# Patient Record
Sex: FEMALE | Race: WHITE | NOT HISPANIC OR LATINO | URBAN - METROPOLITAN AREA
[De-identification: names, ages, dates, MRNs, and addresses within clinical notes are randomized per-mention and may not be internally consistent; named-entity substitution may affect disease eponyms.]

---

## 2025-01-06 ASSESSMENT — PROMIS GLOBAL HEALTH SCALE
EMOTIONAL_PROBLEMS: SOMETIMES
CARRYOUT_SOCIAL_ACTIVITIES: VERY GOOD
RATE_AVERAGE_FATIGUE: MILD
RATE_SOCIAL_SATISFACTION: VERY GOOD
CARRYOUT_PHYSICAL_ACTIVITIES: COMPLETELY
RATE_QUALITY_OF_LIFE: VERY GOOD
RATE_AVERAGE_PAIN: 1
RATE_MENTAL_HEALTH: VERY GOOD
RATE_PHYSICAL_HEALTH: VERY GOOD
RATE_GENERAL_HEALTH: VERY GOOD

## 2025-01-07 ENCOUNTER — LAB (OUTPATIENT)
Dept: LAB | Facility: LAB | Age: 55
End: 2025-01-07
Payer: COMMERCIAL

## 2025-01-07 ENCOUNTER — APPOINTMENT (OUTPATIENT)
Dept: PRIMARY CARE | Facility: CLINIC | Age: 55
End: 2025-01-07
Payer: COMMERCIAL

## 2025-01-07 VITALS
BODY MASS INDEX: 20.24 KG/M2 | SYSTOLIC BLOOD PRESSURE: 108 MMHG | DIASTOLIC BLOOD PRESSURE: 71 MMHG | HEART RATE: 97 BPM | HEIGHT: 62 IN | WEIGHT: 110 LBS

## 2025-01-07 DIAGNOSIS — Z00.00 ROUTINE GENERAL MEDICAL EXAMINATION AT A HEALTH CARE FACILITY: Primary | ICD-10-CM

## 2025-01-07 DIAGNOSIS — Z12.31 ENCOUNTER FOR SCREENING MAMMOGRAM FOR MALIGNANT NEOPLASM OF BREAST: ICD-10-CM

## 2025-01-07 DIAGNOSIS — Z00.00 ROUTINE GENERAL MEDICAL EXAMINATION AT A HEALTH CARE FACILITY: ICD-10-CM

## 2025-01-07 PROBLEM — L30.9 ECZEMA: Status: ACTIVE | Noted: 2025-01-07

## 2025-01-07 PROBLEM — N39.0 RECURRENT UTI: Status: ACTIVE | Noted: 2025-01-07

## 2025-01-07 PROBLEM — N93.8 DUB (DYSFUNCTIONAL UTERINE BLEEDING): Status: ACTIVE | Noted: 2017-12-05

## 2025-01-07 PROBLEM — E78.2 MIXED HYPERLIPIDEMIA: Status: ACTIVE | Noted: 2025-01-07

## 2025-01-07 LAB
25(OH)D3 SERPL-MCNC: 30 NG/ML (ref 30–100)
ALBUMIN SERPL BCP-MCNC: 4.6 G/DL (ref 3.4–5)
ALP SERPL-CCNC: 44 U/L (ref 33–110)
ALT SERPL W P-5'-P-CCNC: 13 U/L (ref 7–45)
ANION GAP SERPL CALC-SCNC: 14 MMOL/L (ref 10–20)
AST SERPL W P-5'-P-CCNC: 21 U/L (ref 9–39)
BASOPHILS # BLD AUTO: 0.03 X10*3/UL (ref 0–0.1)
BASOPHILS NFR BLD AUTO: 0.6 %
BILIRUB SERPL-MCNC: 0.5 MG/DL (ref 0–1.2)
BUN SERPL-MCNC: 17 MG/DL (ref 6–23)
CALCIUM SERPL-MCNC: 9.8 MG/DL (ref 8.6–10.6)
CHLORIDE SERPL-SCNC: 103 MMOL/L (ref 98–107)
CO2 SERPL-SCNC: 28 MMOL/L (ref 21–32)
CREAT SERPL-MCNC: 0.76 MG/DL (ref 0.5–1.05)
CREAT UR-MCNC: 134.1 MG/DL (ref 20–320)
EGFRCR SERPLBLD CKD-EPI 2021: >90 ML/MIN/1.73M*2
EOSINOPHIL # BLD AUTO: 0.05 X10*3/UL (ref 0–0.7)
EOSINOPHIL NFR BLD AUTO: 1.1 %
ERYTHROCYTE [DISTWIDTH] IN BLOOD BY AUTOMATED COUNT: 12.2 % (ref 11.5–14.5)
GLUCOSE SERPL-MCNC: 63 MG/DL (ref 74–99)
HCT VFR BLD AUTO: 41 % (ref 36–46)
HGB BLD-MCNC: 13.3 G/DL (ref 12–16)
IMM GRANULOCYTES # BLD AUTO: 0 X10*3/UL (ref 0–0.7)
IMM GRANULOCYTES NFR BLD AUTO: 0 % (ref 0–0.9)
LYMPHOCYTES # BLD AUTO: 1.81 X10*3/UL (ref 1.2–4.8)
LYMPHOCYTES NFR BLD AUTO: 38.4 %
MCH RBC QN AUTO: 31.1 PG (ref 26–34)
MCHC RBC AUTO-ENTMCNC: 32.4 G/DL (ref 32–36)
MCV RBC AUTO: 96 FL (ref 80–100)
MICROALBUMIN UR-MCNC: 17.2 MG/L
MICROALBUMIN/CREAT UR: 12.8 UG/MG CREAT
MONOCYTES # BLD AUTO: 0.39 X10*3/UL (ref 0.1–1)
MONOCYTES NFR BLD AUTO: 8.3 %
NEUTROPHILS # BLD AUTO: 2.43 X10*3/UL (ref 1.2–7.7)
NEUTROPHILS NFR BLD AUTO: 51.6 %
NRBC BLD-RTO: 0 /100 WBCS (ref 0–0)
PLATELET # BLD AUTO: 246 X10*3/UL (ref 150–450)
POTASSIUM SERPL-SCNC: 4.2 MMOL/L (ref 3.5–5.3)
PROT SERPL-MCNC: 7.2 G/DL (ref 6.4–8.2)
RBC # BLD AUTO: 4.27 X10*6/UL (ref 4–5.2)
SODIUM SERPL-SCNC: 141 MMOL/L (ref 136–145)
TSH SERPL-ACNC: 1.77 MIU/L (ref 0.44–3.98)
WBC # BLD AUTO: 4.7 X10*3/UL (ref 4.4–11.3)

## 2025-01-07 PROCEDURE — 93000 ELECTROCARDIOGRAM COMPLETE: CPT | Performed by: INTERNAL MEDICINE

## 2025-01-07 PROCEDURE — 99396 PREV VISIT EST AGE 40-64: CPT | Performed by: INTERNAL MEDICINE

## 2025-01-07 PROCEDURE — 82570 ASSAY OF URINE CREATININE: CPT

## 2025-01-07 PROCEDURE — 85025 COMPLETE CBC W/AUTO DIFF WBC: CPT

## 2025-01-07 PROCEDURE — 84443 ASSAY THYROID STIM HORMONE: CPT

## 2025-01-07 PROCEDURE — 82306 VITAMIN D 25 HYDROXY: CPT

## 2025-01-07 PROCEDURE — 1036F TOBACCO NON-USER: CPT | Performed by: INTERNAL MEDICINE

## 2025-01-07 PROCEDURE — 3008F BODY MASS INDEX DOCD: CPT | Performed by: INTERNAL MEDICINE

## 2025-01-07 PROCEDURE — 82043 UR ALBUMIN QUANTITATIVE: CPT

## 2025-01-07 PROCEDURE — 80053 COMPREHEN METABOLIC PANEL: CPT

## 2025-01-07 RX ORDER — ALBENDAZOLE 200 MG/1
TABLET, FILM COATED ORAL
COMMUNITY
Start: 2024-08-14

## 2025-01-07 ASSESSMENT — ENCOUNTER SYMPTOMS
CONSTIPATION: 0
ADENOPATHY: 0
HYPERACTIVE: 0
FREQUENCY: 0
DYSPHORIC MOOD: 0
NAUSEA: 0
CHILLS: 0
BACK PAIN: 0
FEVER: 0
SINUS PAIN: 0
EYE DISCHARGE: 0
BRUISES/BLEEDS EASILY: 0
COLOR CHANGE: 0
PALPITATIONS: 0
HEADACHES: 0
ABDOMINAL PAIN: 0
SINUS PRESSURE: 0
CHEST TIGHTNESS: 0
WEAKNESS: 0
NERVOUS/ANXIOUS: 0
COUGH: 0
DIZZINESS: 0
SHORTNESS OF BREATH: 0
VOMITING: 0
ACTIVITY CHANGE: 0
NECK PAIN: 0
ABDOMINAL DISTENTION: 0
VOICE CHANGE: 0
SORE THROAT: 0
LIGHT-HEADEDNESS: 0
WHEEZING: 0
DIARRHEA: 0
DECREASED CONCENTRATION: 0
EYE ITCHING: 0
RHINORRHEA: 0
NECK STIFFNESS: 0
DIFFICULTY URINATING: 0
ARTHRALGIAS: 1
SEIZURES: 0
HEMATURIA: 0
FATIGUE: 0
SLEEP DISTURBANCE: 0
MYALGIAS: 0
APPETITE CHANGE: 0
DYSURIA: 0

## 2025-01-07 NOTE — PATIENT INSTRUCTIONS
It was a pleasure seeing you back in the office today.  We will follow up on all comprehensive blood work once results are available and make any recommendations based on these results as may be indicated.  Please continue with routine gynecologic exams and annual mammograms.  Colorectal screening will be due in 2026.  Thank you for keeping up with routine vaccines.  A referral has been placed to a perimenopausal gynecology specialist to proceed with routine GYN exams as well as discussed treatment options for your symptoms.  Please let us know if the left wrist continues to bother you, we would be happy to proceed with x-rays.  If you have any questions, please feel free to contact us.  Otherwise, we are happy to see you back essentially annually for your wellness visits.

## 2025-01-07 NOTE — PROGRESS NOTES
Carlyn Kinsey comes in today for a comprehensive physical exam.      Ms. Stiven Kinsey comes in today for a comprehensive physical exam.  She was last seen just over 2 years ago.  She is generally healthy, takes no chronic prescription medications.  She is having some perimenopausal symptoms, still with some hot flashes, vaginal dryness.  She is about 5 years postmenopausal.  She has not seen her gynecologist in about 3 years and is not opposed to getting established with someone new to discuss symptoms.  She tripped and fell over the summer, fell onto her left wrist.  This was quite bruised and swollen but she did not feel that this was fractured, so she did not have this evaluated.  Now when she rakes leaves or does anything extensive with her wrist, she still has some discomfort, but this is mild and only intermittent.  She also has some discomfort in her right wrist intermittently and she feels as though there may be some arthritis in her fingers and her wrists.  She is not interested in further evaluation at this time, just wanted to mention this.  She denies any headaches, dizziness, chest pain or palpitations, shortness of breath nor cough, nausea, vomiting, nor changes in bowel nor bladder habits.  She otherwise feels well.  She has routine lipids done through her work insurance every year all of which have remained stable and she will try to forward these results to us.  She is not fasting today but is amenable to updating additional labs, deferring her lipid profile to her work testing, last checked just in October.        Review of Systems   Constitutional:  Negative for activity change, appetite change, chills, fatigue and fever.   HENT:  Negative for congestion, ear pain, postnasal drip, rhinorrhea, sinus pressure, sinus pain, sneezing, sore throat, tinnitus and voice change.    Eyes:  Negative for discharge, itching and visual disturbance.   Respiratory:  Negative for cough, chest tightness,  shortness of breath and wheezing.    Cardiovascular:  Negative for chest pain, palpitations and leg swelling.   Gastrointestinal:  Negative for abdominal distention, abdominal pain, constipation, diarrhea, nausea and vomiting.   Endocrine: Negative for cold intolerance, heat intolerance (Intermittent hot flashes) and polyuria.   Genitourinary:  Negative for difficulty urinating, dysuria, frequency, hematuria, urgency, vaginal bleeding, vaginal discharge and vaginal pain (Vaginal dryness).   Musculoskeletal:  Positive for arthralgias (Wrist pain as per HPI, intermittent and manageable). Negative for back pain, myalgias, neck pain and neck stiffness.   Skin:  Negative for color change, pallor and rash.   Allergic/Immunologic: Negative for environmental allergies, food allergies and immunocompromised state.   Neurological:  Negative for dizziness, seizures, syncope, weakness, light-headedness and headaches.   Hematological:  Negative for adenopathy. Does not bruise/bleed easily.   Psychiatric/Behavioral:  Negative for behavioral problems, decreased concentration, dysphoric mood and sleep disturbance. The patient is not nervous/anxious and is not hyperactive.        Objective   Physical Exam  Constitutional:       General: She is not in acute distress.     Appearance: Normal appearance. She is well-developed. She is not ill-appearing.   HENT:      Head: Normocephalic.      Right Ear: Tympanic membrane, ear canal and external ear normal.      Left Ear: Tympanic membrane, ear canal and external ear normal.      Nose: Nose normal.      Mouth/Throat:      Mouth: Mucous membranes are moist.      Pharynx: Oropharynx is clear. No oropharyngeal exudate or posterior oropharyngeal erythema.   Eyes:      General: Lids are normal. No scleral icterus.     Extraocular Movements: Extraocular movements intact.      Conjunctiva/sclera: Conjunctivae normal.      Pupils: Pupils are equal, round, and reactive to light.   Neck:       Vascular: No carotid bruit.   Cardiovascular:      Rate and Rhythm: Normal rate and regular rhythm.      Pulses: Normal pulses.      Heart sounds: No murmur heard.     No gallop.   Pulmonary:      Effort: Pulmonary effort is normal. No respiratory distress.      Breath sounds: No wheezing, rhonchi or rales.   Abdominal:      General: Bowel sounds are normal. There is no distension.      Palpations: Abdomen is soft. There is no mass.      Tenderness: There is no abdominal tenderness. There is no guarding or rebound.   Genitourinary:     General: Normal vulva.      Vagina: No vaginal discharge.   Musculoskeletal:         General: No swelling or signs of injury.      Cervical back: Normal range of motion and neck supple. No tenderness.      Right lower leg: No edema.      Left lower leg: No edema.   Lymphadenopathy:      Cervical: No cervical adenopathy.   Skin:     General: Skin is warm and dry.      Coloration: Skin is not pale.      Findings: No bruising or rash.   Neurological:      General: No focal deficit present.      Mental Status: She is alert and oriented to person, place, and time.      Cranial Nerves: No cranial nerve deficit.      Motor: No weakness.      Coordination: Coordination normal.      Gait: Gait normal.   Psychiatric:         Mood and Affect: Mood normal.         Behavior: Behavior normal.         Thought Content: Thought content normal.         Judgment: Judgment normal.       Assessment/Plan   Full age-appropriate comprehensive physical exam and health care maintenance performed and discussed today.  Routine safety and preventative measures discussed including self breast exam, seatbelt use, no drinking and driving, no texting and driving, abstinence or cessation of tobacco use, routine dental and vision exams, healthy diet and regular exercise.    We will update comprehensive labs and follow-up on results once available.  Cologuard up-to-date from 2023 with negative/normal results, repeat CR  screening 3 years total.  Due for mammogram.  Order placed.  EKG as above.  Has had CT calcium scoring scan which looked quite reassuring, score of 0.  DEXA screening start age-appropriate times in the future.  Due for Pap this year, plans on following up with GYN.  Referral placed to perimenopausal specialist.  Tetanus remains up-to-date from 2016.  Has completed shingles vaccine series and received annual flu shot.    I have offered wrist x-rays, she is declining at this time but plans to contact us if she has any persistence or worsening of symptoms.  Happy to see her back essentially annually for her wellness visits.  She is to contact us if she has any questions in the interim.    Problem List Items Addressed This Visit    None  Visit Diagnoses       Routine general medical examination at a health care facility    -  Primary    Relevant Orders    ECG 12 lead (Clinic Performed)

## 2025-01-24 ENCOUNTER — APPOINTMENT (OUTPATIENT)
Dept: PRIMARY CARE | Facility: CLINIC | Age: 55
End: 2025-01-24

## 2025-02-03 ENCOUNTER — APPOINTMENT (OUTPATIENT)
Dept: OBSTETRICS AND GYNECOLOGY | Facility: CLINIC | Age: 55
End: 2025-02-03
Payer: COMMERCIAL

## 2025-02-13 ENCOUNTER — HOSPITAL ENCOUNTER (OUTPATIENT)
Dept: RADIOLOGY | Facility: CLINIC | Age: 55
End: 2025-02-13
Payer: COMMERCIAL

## 2025-02-21 ENCOUNTER — HOSPITAL ENCOUNTER (OUTPATIENT)
Dept: RADIOLOGY | Facility: CLINIC | Age: 55
Discharge: HOME | End: 2025-02-21
Payer: COMMERCIAL

## 2025-02-21 VITALS — WEIGHT: 112 LBS | HEIGHT: 62 IN | BODY MASS INDEX: 20.61 KG/M2

## 2025-02-21 DIAGNOSIS — Z12.31 ENCOUNTER FOR SCREENING MAMMOGRAM FOR MALIGNANT NEOPLASM OF BREAST: ICD-10-CM

## 2025-02-21 PROCEDURE — 77063 BREAST TOMOSYNTHESIS BI: CPT | Performed by: RADIOLOGY

## 2025-02-21 PROCEDURE — 77067 SCR MAMMO BI INCL CAD: CPT | Performed by: RADIOLOGY

## 2025-02-21 PROCEDURE — 77067 SCR MAMMO BI INCL CAD: CPT

## 2025-07-07 ENCOUNTER — APPOINTMENT (OUTPATIENT)
Dept: OBSTETRICS AND GYNECOLOGY | Facility: CLINIC | Age: 55
End: 2025-07-07
Payer: COMMERCIAL

## 2025-07-07 VITALS
HEIGHT: 62 IN | BODY MASS INDEX: 20.68 KG/M2 | WEIGHT: 112.4 LBS | DIASTOLIC BLOOD PRESSURE: 60 MMHG | SYSTOLIC BLOOD PRESSURE: 108 MMHG

## 2025-07-07 DIAGNOSIS — Z79.890 MENOPAUSAL SYNDROME ON HORMONE REPLACEMENT THERAPY: ICD-10-CM

## 2025-07-07 DIAGNOSIS — Z00.00 ROUTINE GENERAL MEDICAL EXAMINATION AT A HEALTH CARE FACILITY: ICD-10-CM

## 2025-07-07 DIAGNOSIS — Z12.4 CERVICAL CANCER SCREENING: ICD-10-CM

## 2025-07-07 DIAGNOSIS — N95.1 MENOPAUSAL SYNDROME ON HORMONE REPLACEMENT THERAPY: ICD-10-CM

## 2025-07-07 DIAGNOSIS — Z01.419 WELL WOMAN EXAM WITH ROUTINE GYNECOLOGICAL EXAM: Primary | ICD-10-CM

## 2025-07-07 DIAGNOSIS — N95.8 GENITOURINARY SYNDROME OF MENOPAUSE: ICD-10-CM

## 2025-07-07 PROCEDURE — 1036F TOBACCO NON-USER: CPT | Performed by: NURSE PRACTITIONER

## 2025-07-07 PROCEDURE — 99386 PREV VISIT NEW AGE 40-64: CPT | Performed by: NURSE PRACTITIONER

## 2025-07-07 PROCEDURE — 88142 CYTOPATH C/V THIN LAYER: CPT

## 2025-07-07 PROCEDURE — 3008F BODY MASS INDEX DOCD: CPT | Performed by: NURSE PRACTITIONER

## 2025-07-07 PROCEDURE — 87626 HPV SEP HI-RSK TYP&POOL RSLT: CPT

## 2025-07-07 RX ORDER — PROGESTERONE 100 MG/1
100 CAPSULE ORAL DAILY
Qty: 30 CAPSULE | Refills: 11 | Status: SHIPPED | OUTPATIENT
Start: 2025-07-07

## 2025-07-07 RX ORDER — ESTRADIOL 0.03 MG/D
1 PATCH TRANSDERMAL WEEKLY
Qty: 12 PATCH | Refills: 3 | Status: SHIPPED | OUTPATIENT
Start: 2025-07-07 | End: 2026-07-07

## 2025-07-07 RX ORDER — ESTRADIOL 0.1 MG/G
CREAM VAGINAL
Qty: 42.5 G | Refills: 3 | Status: SHIPPED | OUTPATIENT
Start: 2025-07-07

## 2025-07-07 ASSESSMENT — ENCOUNTER SYMPTOMS
CARDIOVASCULAR NEGATIVE: 0
RESPIRATORY NEGATIVE: 0
EYES NEGATIVE: 0
NEUROLOGICAL NEGATIVE: 0
ENDOCRINE NEGATIVE: 0
ALLERGIC/IMMUNOLOGIC NEGATIVE: 0
CONSTITUTIONAL NEGATIVE: 0
MUSCULOSKELETAL NEGATIVE: 0
PSYCHIATRIC NEGATIVE: 0
HEMATOLOGIC/LYMPHATIC NEGATIVE: 0
GASTROINTESTINAL NEGATIVE: 0

## 2025-07-07 ASSESSMENT — PATIENT HEALTH QUESTIONNAIRE - PHQ9
2. FEELING DOWN, DEPRESSED OR HOPELESS: NOT AT ALL
1. LITTLE INTEREST OR PLEASURE IN DOING THINGS: NOT AT ALL
SUM OF ALL RESPONSES TO PHQ9 QUESTIONS 1 & 2: 0

## 2025-07-07 ASSESSMENT — PAIN SCALES - GENERAL: PAINLEVEL_OUTOF10: 0-NO PAIN

## 2025-07-07 NOTE — LETTER
July 7, 2025     Mckenzie Argueta MD  [address]  [facility] [address]  [facility] [address]    Patient: Carlyn Kinsey   YOB: 1970   Date of Visit: 7/7/2025       Dear Dr. Mckenzie Argueta MD:    Thank you for referring Carlyn Kinsey to me for evaluation. Below are my notes for this consultation.  If you have questions, please do not hesitate to call me. I look forward to following your patient along with you.       Sincerely,     Bj Ibanez, APRN-CNP      CC: No Recipients  ______________________________________________________________________________________    Subjective  Patient ID: Carlyn Kinsey is a 55 y.o. female who presents for Annual Exam (Pap 2008).  HPI  Pt presents for annual exam  Concerns:     Age at Menopause: 49    History of a DVT/PE: none  History of HTN: none  History of elevated lipids: yes, not treatment  Tobacco use: none  Personal history of breast cancer: none    History of HT: none  History of compounded bioidentical: none  History of OTC treatments for menopausal symptoms: none  History of prescription, non-hormonal medications for menopausal symptoms: none    Postmenopausal bleeding: none  Mood changes: none  Sleep problems: yes  VMS: yes  Joint pain: occasional   Brain fog/difficulty concentrating: yes    GSM: yes  are you sexually active?: yes  do you have any loss in desire?: yes  do you have any pain with intercourse?: yes  are you able to have an orgasm?: yes     Vaginal hygiene: soap-bar  Urinary incontinence: none      H/O of STI: none  requesting sti testing?: none  H/O abnormal pap: none    Lives with: by herself  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   Constitutional:       Appearance: Normal appearance.   Cardiovascular:      Rate and Rhythm: Normal rate and regular rhythm.      Heart sounds: Normal heart sounds.   Pulmonary:      Effort: Pulmonary effort is normal.      Breath sounds: Normal breath sounds.   Chest:   Breasts:     Right: Normal.      Left: Normal.   Abdominal:      Tenderness: There is no abdominal tenderness.   Genitourinary:     Exam position: Lithotomy position.      Labia:         Right: No lesion.         Left: No lesion.       Vagina: Normal.      Cervix: Normal.      Comments: Mild GSM noted, no evidence of vulvar dermatoses  Skin:     General: Skin is warm and dry.   Neurological:      Mental Status: She is alert.   Psychiatric:         Attention and Perception: Attention normal.         Mood and Affect: Mood normal.         Speech: Speech normal.         Behavior: Behavior normal.         Thought Content: Thought content normal.         Cognition and Memory: Cognition and memory normal.         Judgment: Judgment normal.         Assessment/Plan  Diagnoses and all orders for this visit:  Well woman exam with routine gynecological exam  Routine general medical examination at a health care facility  -     Referral to Gynecology  Menopausal syndrome on hormone replacement therapy  -     progesterone (Prometrium) 100 mg capsule; Take 1 capsule (100 mg) by mouth once daily. Take at bedtime  -     estradiol (Climara) 0.025 mg/24 hr patch; Place 1 patch over 7 days on the skin 1 (one) time per week.  Genitourinary syndrome of menopause  -     estradiol (Estrace) 0.01 % (0.1 mg/gram) vaginal cream; Discard the applicator and apply a blueberry sized amount to the vaginal opening and just inside the vagina daily for 14 days followed by 3 times/week  Cervical cancer screening  -     THINPREP PAP TEST    We discussed risks/benefits of MHT and I prescribed them not only for her  symptoms but I also discussed her increased risk of osteoporosis d/t FH and weight of 112  Genitourinary syndrome of menopause is very common and undertreated in perimenopausal and menopausal women. It is due to the low levels of estrogen and sex steroids. Common symptoms include: vaginal dryness, pain with sex, frequent urinary tract infections, burning, itching, and irritation. Treatment options can include vaginal moisturizers and lubricants and prescription vaginal estrogen, intravaginal DHEA and oral Osphena. The prescription options have similar efficacy and low risk. However, there are black box warnings on the vaginal estrogen products and Osphena that are based off of studies of systemic HT in the [facility] study, not studies done on vaginal estrogen or Osphena.    The prescription options may take up to 12 weeks to alleviate symptoms and if discontinued, the symptoms will return.    Follow up in 10 weeks       RADHA Joe 07/07/25 8:02 AM

## 2025-07-07 NOTE — PROGRESS NOTES
Subjective   Patient ID: Carlyn Kinsey is a 55 y.o. female who presents for Annual Exam (Pap 2008).  HPI  Pt presents for annual exam  Concerns:     Age at Menopause: 49    History of a DVT/PE: none  History of HTN: none  History of elevated lipids: yes, not treatment  Tobacco use: none  Personal history of breast cancer: none    History of HT: none  History of compounded bioidentical: none  History of OTC treatments for menopausal symptoms: none  History of prescription, non-hormonal medications for menopausal symptoms: none    Postmenopausal bleeding: none  Mood changes: none  Sleep problems: yes  VMS: yes  Joint pain: occasional   Brain fog/difficulty concentrating: yes    GSM: yes  are you sexually active?: yes  do you have any loss in desire?: yes  do you have any pain with intercourse?: yes  are you able to have an orgasm?: yes     Vaginal hygiene: soap-bar  Urinary incontinence: none      H/O of STI: none  requesting sti testing?: none  H/O abnormal pap: none    Lives with: by herself  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   Constitutional:       Appearance: Normal appearance.   Cardiovascular:      Rate and Rhythm: Normal rate and regular rhythm.      Heart sounds: Normal heart sounds.   Pulmonary:      Effort: Pulmonary effort is normal.      Breath sounds: Normal breath sounds.   Chest:   Breasts:     Right: Normal.      Left: Normal.   Abdominal:      Tenderness: There is no abdominal tenderness.   Genitourinary:     Exam position: Lithotomy position.      Labia:         Right: No lesion.         Left: No lesion.       Vagina: Normal.      Cervix: Normal.       Comments: Mild GSM noted, no evidence of vulvar dermatoses  Skin:     General: Skin is warm and dry.   Neurological:      Mental Status: She is alert.   Psychiatric:         Attention and Perception: Attention normal.         Mood and Affect: Mood normal.         Speech: Speech normal.         Behavior: Behavior normal.         Thought Content: Thought content normal.         Cognition and Memory: Cognition and memory normal.         Judgment: Judgment normal.         Assessment/Plan   Diagnoses and all orders for this visit:  Well woman exam with routine gynecological exam  Routine general medical examination at a health care facility  -     Referral to Gynecology  Menopausal syndrome on hormone replacement therapy  -     progesterone (Prometrium) 100 mg capsule; Take 1 capsule (100 mg) by mouth once daily. Take at bedtime  -     estradiol (Climara) 0.025 mg/24 hr patch; Place 1 patch over 7 days on the skin 1 (one) time per week.  Genitourinary syndrome of menopause  -     estradiol (Estrace) 0.01 % (0.1 mg/gram) vaginal cream; Discard the applicator and apply a blueberry sized amount to the vaginal opening and just inside the vagina daily for 14 days followed by 3 times/week  Cervical cancer screening  -     THINPREP PAP TEST    We discussed risks/benefits of MHT and I prescribed them not only for her symptoms but I also discussed her increased risk of osteoporosis d/t FH and weight of 112  Genitourinary syndrome of menopause is very common and undertreated in perimenopausal and menopausal women. It is due to the low levels of estrogen and sex steroids. Common symptoms include: vaginal dryness, pain with sex, frequent urinary tract infections, burning, itching, and irritation. Treatment options can include vaginal moisturizers and lubricants and prescription vaginal estrogen, intravaginal DHEA and oral Osphena. The prescription options have similar efficacy and low risk. However, there are black box warnings on  the vaginal estrogen products and Osphena that are based off of studies of systemic HT in the [facility] study, not studies done on vaginal estrogen or Osphena.    The prescription options may take up to 12 weeks to alleviate symptoms and if discontinued, the symptoms will return.    Follow up in 10 weeks       XAVIER Joe-CNP 07/07/25 8:02 AM

## 2025-07-24 LAB
CYTOLOGY CMNT CVX/VAG CYTO-IMP: NORMAL
HPV HR 12 DNA GENITAL QL NAA+PROBE: NEGATIVE
HPV HR GENOTYPES PNL CVX NAA+PROBE: NEGATIVE
HPV16 DNA SPEC QL NAA+PROBE: NEGATIVE
HPV18 DNA SPEC QL NAA+PROBE: NEGATIVE
LAB AP HPV GENOTYPE QUESTION: YES
LAB AP HPV HR: NORMAL
LABORATORY COMMENT REPORT: NORMAL
LABORATORY COMMENT REPORT: NORMAL
PATH REPORT.TOTAL CANCER: NORMAL

## 2025-09-08 ENCOUNTER — APPOINTMENT (OUTPATIENT)
Dept: OBSTETRICS AND GYNECOLOGY | Facility: CLINIC | Age: 55
End: 2025-09-08
Payer: COMMERCIAL